# Patient Record
Sex: MALE | Race: WHITE | NOT HISPANIC OR LATINO
[De-identification: names, ages, dates, MRNs, and addresses within clinical notes are randomized per-mention and may not be internally consistent; named-entity substitution may affect disease eponyms.]

---

## 2021-02-22 PROBLEM — Z00.00 ENCOUNTER FOR PREVENTIVE HEALTH EXAMINATION: Status: ACTIVE | Noted: 2021-02-22

## 2021-04-13 ENCOUNTER — APPOINTMENT (OUTPATIENT)
Dept: NEUROLOGY | Facility: CLINIC | Age: 61
End: 2021-04-13
Payer: COMMERCIAL

## 2021-04-13 VITALS
SYSTOLIC BLOOD PRESSURE: 124 MMHG | WEIGHT: 249.7 LBS | HEIGHT: 68 IN | BODY MASS INDEX: 37.84 KG/M2 | DIASTOLIC BLOOD PRESSURE: 78 MMHG

## 2021-04-13 DIAGNOSIS — Z86.39 PERSONAL HISTORY OF OTHER ENDOCRINE, NUTRITIONAL AND METABOLIC DISEASE: ICD-10-CM

## 2021-04-13 DIAGNOSIS — Z82.3 FAMILY HISTORY OF STROKE: ICD-10-CM

## 2021-04-13 DIAGNOSIS — Z86.69 PERSONAL HISTORY OF OTHER DISEASES OF THE NERVOUS SYSTEM AND SENSE ORGANS: ICD-10-CM

## 2021-04-13 DIAGNOSIS — Z86.79 PERSONAL HISTORY OF OTHER DISEASES OF THE CIRCULATORY SYSTEM: ICD-10-CM

## 2021-04-13 DIAGNOSIS — Z82.49 FAMILY HISTORY OF ISCHEMIC HEART DISEASE AND OTHER DISEASES OF THE CIRCULATORY SYSTEM: ICD-10-CM

## 2021-04-13 DIAGNOSIS — G31.84 MILD COGNITIVE IMPAIRMENT, SO STATED: ICD-10-CM

## 2021-04-13 PROCEDURE — 99072 ADDL SUPL MATRL&STAF TM PHE: CPT

## 2021-04-13 PROCEDURE — 93040 RHYTHM ECG WITH REPORT: CPT

## 2021-04-13 PROCEDURE — 95819 EEG AWAKE AND ASLEEP: CPT

## 2021-04-13 PROCEDURE — 99204 OFFICE O/P NEW MOD 45 MIN: CPT

## 2021-04-13 RX ORDER — HYDROCHLOROTHIAZIDE 25 MG/1
25 TABLET ORAL
Refills: 0 | Status: ACTIVE | COMMUNITY

## 2021-04-13 RX ORDER — CARVEDILOL 6.25 MG/1
6.25 TABLET, FILM COATED ORAL
Refills: 0 | Status: COMPLETED | COMMUNITY

## 2021-04-13 RX ORDER — CARVEDILOL 6.25 MG/1
6.25 TABLET, FILM COATED ORAL
Refills: 0 | Status: ACTIVE | COMMUNITY

## 2021-04-13 RX ORDER — ROSUVASTATIN CALCIUM 5 MG/1
5 TABLET, FILM COATED ORAL
Refills: 0 | Status: ACTIVE | COMMUNITY

## 2021-04-13 NOTE — REVIEW OF SYSTEMS
[As Noted in HPI] : as noted in HPI [Loss Of Hearing] : hearing loss [Negative] : Heme/Lymph [Heartburn] : heartburn

## 2021-04-13 NOTE — ASSESSMENT
[FreeTextEntry1] : This is a 60-year-old man with mild cognitive impairment.\par He scored perfectly on Mini-Mental state examination in my office today.\par \par I would like to obtain an MRI of the brain to evaluate for structural pathology.\par I will obtain an EEG.\par I will obtain blood tests to look for reversible causes of memory loss.\par \par This may have been exacerbated due to the psychosocial stressors he is under.\par \par I will refer him for a neuropsychology evaluation.\par \par I will see him back in a few months.
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
## 2021-04-13 NOTE — PHYSICAL EXAM
[General Appearance - Alert] : alert [General Appearance - In No Acute Distress] : in no acute distress [Affect] : the affect was normal [Cranial Nerves Optic (II)] : visual acuity intact bilaterally,  visual fields full to confrontation, pupils equal round and reactive to light [Cranial Nerves Oculomotor (III)] : extraocular motion intact [Cranial Nerves Trigeminal (V)] : facial sensation intact symmetrically [Cranial Nerves Facial (VII)] : face symmetrical [Cranial Nerves Glossopharyngeal (IX)] : tongue and palate midline [Cranial Nerves Accessory (XI - Cranial And Spinal)] : head turning and shoulder shrug symmetric [Cranial Nerves Hypoglossal (XII)] : there was no tongue deviation with protrusion [Motor Tone] : muscle tone was normal in all four extremities [Motor Strength] : muscle strength was normal in all four extremities [Sensation Tactile Decrease] : light touch was intact [Sensation Pain / Temperature Decrease] : pain and temperature was intact [Sensation Vibration Decrease] : vibration was intact [Abnormal Walk] : normal gait [2+] : Ankle jerk right 2+ [Optic Disc Abnormality] : the optic disc were normal in size and color [Oriented To Time, Place, And Person] : oriented to person, place, and time [Memory Remote] : remote memory was not impaired [Total Score ___ / 30] : the patient achieved a score of [unfilled] /30 [Date / Time ___ / 5] : date / time [unfilled] / 5 [Place ___ / 5] : place [unfilled] / 5 [Registration ___ / 3] : registration [unfilled] / 3 [Serial Sevens ___/5] : serial sevens [unfilled] / 5 [Naming 2 Objects ___ / 2] : naming two objects [unfilled] / 2 [Repeating a Sentence ___ / 1] : repeating a sentence [unfilled] / 1 [Writing a Sentence ___ / 1] : write sentence [unfilled] / 1 [3-stage Verbal Command ___ / 3] : three-stage verbal command [unfilled] / 3 [Written Command ___ / 1] : written command [unfilled] / 1 [Copy a Design ___ / 1] : copy a design [unfilled] / 1 [Recall ___ / 3] : recall [unfilled] / 3 [Aphasia] : no dysphasia/aphasia [Romberg's Sign] : Romberg's sign was negtive [Coordination - Dysmetria Impaired Finger-to-Nose Bilateral] : not present [Plantar Reflex Right Only] : normal on the right [FreeTextEntry9] : (He had recent surgery on the left foot).

## 2021-04-15 ENCOUNTER — NON-APPOINTMENT (OUTPATIENT)
Age: 61
End: 2021-04-15

## 2021-04-19 ENCOUNTER — APPOINTMENT (OUTPATIENT)
Dept: MRI IMAGING | Facility: CLINIC | Age: 61
End: 2021-04-19
Payer: COMMERCIAL

## 2021-04-19 ENCOUNTER — OUTPATIENT (OUTPATIENT)
Dept: OUTPATIENT SERVICES | Facility: HOSPITAL | Age: 61
LOS: 1 days | End: 2021-04-19
Payer: COMMERCIAL

## 2021-04-19 DIAGNOSIS — G31.84 MILD COGNITIVE IMPAIRMENT OF UNCERTAIN OR UNKNOWN ETIOLOGY: ICD-10-CM

## 2021-04-19 DIAGNOSIS — Z87.438 PERSONAL HISTORY OF OTHER DISEASES OF MALE GENITAL ORGANS: Chronic | ICD-10-CM

## 2021-04-19 DIAGNOSIS — K46.9 UNSPECIFIED ABDOMINAL HERNIA WITHOUT OBSTRUCTION OR GANGRENE: Chronic | ICD-10-CM

## 2021-04-19 DIAGNOSIS — Z98.89 OTHER SPECIFIED POSTPROCEDURAL STATES: Chronic | ICD-10-CM

## 2021-04-19 PROCEDURE — 70551 MRI BRAIN STEM W/O DYE: CPT

## 2021-04-19 PROCEDURE — 70551 MRI BRAIN STEM W/O DYE: CPT | Mod: 26

## 2021-04-28 ENCOUNTER — LABORATORY RESULT (OUTPATIENT)
Age: 61
End: 2021-04-28

## 2021-04-29 ENCOUNTER — NON-APPOINTMENT (OUTPATIENT)
Age: 61
End: 2021-04-29

## 2021-04-29 LAB
FOLATE SERPL-MCNC: 10.5 NG/ML
T3RU NFR SERPL: 1 TBI
T4 SERPL-MCNC: 6 UG/DL
TSH SERPL-ACNC: 2.67 UIU/ML
VIT B12 SERPL-MCNC: 511 PG/ML

## 2021-05-02 LAB — METHYLMALONATE SERPL-SCNC: 200 NMOL/L

## 2021-07-22 ENCOUNTER — APPOINTMENT (OUTPATIENT)
Dept: NEUROLOGY | Facility: CLINIC | Age: 61
End: 2021-07-22

## 2021-10-12 ENCOUNTER — APPOINTMENT (OUTPATIENT)
Dept: ORTHOPEDIC SURGERY | Facility: CLINIC | Age: 61
End: 2021-10-12
Payer: COMMERCIAL

## 2021-10-12 VITALS
SYSTOLIC BLOOD PRESSURE: 127 MMHG | HEIGHT: 68 IN | WEIGHT: 257 LBS | HEART RATE: 88 BPM | DIASTOLIC BLOOD PRESSURE: 84 MMHG | BODY MASS INDEX: 38.95 KG/M2

## 2021-10-12 PROCEDURE — 99204 OFFICE O/P NEW MOD 45 MIN: CPT | Mod: 25

## 2021-10-12 PROCEDURE — 29130 APPL FINGER SPLINT STATIC: CPT | Mod: F2

## 2021-10-12 NOTE — PHYSICAL EXAM
[de-identified] : GENERAL: Awake, alert, cooperative, answers questions appropriately. No acute distress.  Ambulates independently with a normal gait.\par SKIN: Warm, dry, intact. Color and turgor normal. \par LUNGS: Demonstrates unlabored breathing on room air with no accessory muscle use.\par EXTREMITIES: Warm and well-perfused. \par NEUROLOGICAL: Grossly intact.\par \par FOCUSED UPPER EXTREMITY EXAM: \par bilateral UE:\par -skin intact without any erythema, ecchymosis, or joint swelling; normal finger cascade with no malrotation; mild swelling along the middle and ring fingers\par -no thenar atrophy; no intrinsics wasting; 5/5 strength thumb opposition; 5/5 strength intrinsics\par -mild tenderness to palpation A1 pulley of the affected finger(s) with palpable nodule, occasional triggering during digit AROM \par -no tenderness to palpation along first dorsal wrist compartment\par -no tenderness to palpation at the anatomic snuffbox\par -no tenderness to palpation at base of the thumb\par -no pain with passive thumb circumduction with negative grind test\par -no pain with resisted thumb extension with negative Finkelstein test\par -no tenderness to palpation in the SL interval\par -no tenderness at the fovea\par -able to make full fist, free AROM in all fingers except for the affected finger(s) with stiffness; no scissoring\par -full wrist ROM; full forearm supination/pronation; no ECU subluxation; DRUJ stable and nontender\par -negative carpal tunnel compression test, negative Phalen's, negative Tinel's at wrist\par -sensation intact in all digits and in the radial, ulnar, and median nerve distributions\par -Brisk capillary refill, fingers warm well perfused\par

## 2021-10-12 NOTE — PROCEDURE
[FreeTextEntry1] : Finger Splint application\par \par With patient's verbal consent, an oval-8 finger splint was fitted and applied to the right middle, left middle and ring fingers for the patient to keep PIP in extension at night.  He tolerated the procedure well and is NVI afterwards.  Splint instructions explained and he expressed understanding.

## 2021-10-12 NOTE — HISTORY OF PRESENT ILLNESS
[FreeTextEntry1] : 10/12/2021\par Mr. HAROON ST is a pleasant 60 year old male, RHD, part time  and animal shelter volunteer.\par He is hard of hearing but read lips.\par \par He presents to the office with his wife for evaluation of bilateral middle and ring finger triggering.\par He has had these symptoms for two years.\par He was treated by outside provider with cortisone injection on each hand with marginal help, but therapy seemed to help more.\par \par He denies prior injury.\par Currently his pain is constant, achy, sharp at time, without radiation.\par He denies paresthesia in the fingertips.\par He endorses night symptoms but splinting helps\par Symptoms improve with rest.\par Symptoms worsen with repetitive activity.\par  \par He is not diabetic.\par He denies history of thyroid disease.\par He denies current nicotine use.\par He denies recreational drug use.\par He does not take any narcotic pain medication.

## 2021-10-12 NOTE — DISCUSSION/SUMMARY
[FreeTextEntry1] : 60 year old male with bilateral middle and ring finger recurrent trigger fingers\par \par -We discussed in detail the clinical findings\par -We discussed the pathophysiology and natural history of patient's condition\par -nonsurgical and surgical management options were discussed in detail\par -Patient has tried a course of nonoperative management and continues to have symptoms affecting activities of daily living and quality of life, he would like to proceed to surgical management of his condition starting on the left side as this was the more symptomatic side\par -We discussed in detail the planned surgical procedure of left middle and ring finger trigger finger releases, including the risks, benefits, alternatives, and expected post-operative course\par -risks include but not limited to: anesthesia complication; bleeding; infection; damage to surrounding structures including nerve, blood vessel, muscle, tendon, ligament, bone, skin; persistent pain; stiffness; complex regional pain syndrome; wound healing complication; scarring; loss of function; residual or recurrent symptoms; need for additional surgery; blood clots; pulmonary embolism; heart attack; stroke; even death. \par -Patient would like to proceed with surgery, he will discuss with our  to select a surgical date\par -I provided him with finger extension splints for night time wear\par -I also provided him with home exercises and/or educational material when available and/or appropriate\par -I prescribed a short course of meloxicam daily for two weeks.  We discussed the GI and renal side effects of NSAIDs.\par -I will see him after surgery for follow up, sooner as needed \par -Patient and family had the opportunity to ask questions and they were in agreement with the plan.\par -Over 50% of the time spent with the patient was on counseling the patient on the above diagnosis, treatment plan and prognosis.

## 2021-11-05 ENCOUNTER — OUTPATIENT (OUTPATIENT)
Dept: OUTPATIENT SERVICES | Facility: HOSPITAL | Age: 61
LOS: 1 days | End: 2021-11-05
Payer: COMMERCIAL

## 2021-11-05 DIAGNOSIS — K46.9 UNSPECIFIED ABDOMINAL HERNIA WITHOUT OBSTRUCTION OR GANGRENE: Chronic | ICD-10-CM

## 2021-11-05 DIAGNOSIS — Z01.818 ENCOUNTER FOR OTHER PREPROCEDURAL EXAMINATION: ICD-10-CM

## 2021-11-05 DIAGNOSIS — Z98.89 OTHER SPECIFIED POSTPROCEDURAL STATES: Chronic | ICD-10-CM

## 2021-11-05 DIAGNOSIS — Z87.438 PERSONAL HISTORY OF OTHER DISEASES OF MALE GENITAL ORGANS: Chronic | ICD-10-CM

## 2021-11-05 LAB
ANION GAP SERPL CALC-SCNC: 12 MMOL/L — SIGNIFICANT CHANGE UP (ref 5–17)
BASOPHILS # BLD AUTO: 0.07 K/UL — SIGNIFICANT CHANGE UP (ref 0–0.2)
BASOPHILS NFR BLD AUTO: 0.8 % — SIGNIFICANT CHANGE UP (ref 0–2)
BUN SERPL-MCNC: 19.7 MG/DL — SIGNIFICANT CHANGE UP (ref 8–20)
CALCIUM SERPL-MCNC: 10.2 MG/DL — SIGNIFICANT CHANGE UP (ref 8.6–10.2)
CHLORIDE SERPL-SCNC: 102 MMOL/L — SIGNIFICANT CHANGE UP (ref 98–107)
CO2 SERPL-SCNC: 28 MMOL/L — SIGNIFICANT CHANGE UP (ref 22–29)
CREAT SERPL-MCNC: 0.93 MG/DL — SIGNIFICANT CHANGE UP (ref 0.5–1.3)
EOSINOPHIL # BLD AUTO: 0.26 K/UL — SIGNIFICANT CHANGE UP (ref 0–0.5)
EOSINOPHIL NFR BLD AUTO: 3.1 % — SIGNIFICANT CHANGE UP (ref 0–6)
GLUCOSE SERPL-MCNC: 106 MG/DL — HIGH (ref 70–99)
HCT VFR BLD CALC: 44.7 % — SIGNIFICANT CHANGE UP (ref 39–50)
HGB BLD-MCNC: 14.6 G/DL — SIGNIFICANT CHANGE UP (ref 13–17)
IMM GRANULOCYTES NFR BLD AUTO: 0.4 % — SIGNIFICANT CHANGE UP (ref 0–1.5)
LYMPHOCYTES # BLD AUTO: 1.4 K/UL — SIGNIFICANT CHANGE UP (ref 1–3.3)
LYMPHOCYTES # BLD AUTO: 16.8 % — SIGNIFICANT CHANGE UP (ref 13–44)
MCHC RBC-ENTMCNC: 28.3 PG — SIGNIFICANT CHANGE UP (ref 27–34)
MCHC RBC-ENTMCNC: 32.7 GM/DL — SIGNIFICANT CHANGE UP (ref 32–36)
MCV RBC AUTO: 86.6 FL — SIGNIFICANT CHANGE UP (ref 80–100)
MONOCYTES # BLD AUTO: 0.85 K/UL — SIGNIFICANT CHANGE UP (ref 0–0.9)
MONOCYTES NFR BLD AUTO: 10.2 % — SIGNIFICANT CHANGE UP (ref 2–14)
NEUTROPHILS # BLD AUTO: 5.73 K/UL — SIGNIFICANT CHANGE UP (ref 1.8–7.4)
NEUTROPHILS NFR BLD AUTO: 68.7 % — SIGNIFICANT CHANGE UP (ref 43–77)
PLATELET # BLD AUTO: 290 K/UL — SIGNIFICANT CHANGE UP (ref 150–400)
POTASSIUM SERPL-MCNC: 3.7 MMOL/L — SIGNIFICANT CHANGE UP (ref 3.5–5.3)
POTASSIUM SERPL-SCNC: 3.7 MMOL/L — SIGNIFICANT CHANGE UP (ref 3.5–5.3)
RBC # BLD: 5.16 M/UL — SIGNIFICANT CHANGE UP (ref 4.2–5.8)
RBC # FLD: 13.6 % — SIGNIFICANT CHANGE UP (ref 10.3–14.5)
SODIUM SERPL-SCNC: 142 MMOL/L — SIGNIFICANT CHANGE UP (ref 135–145)
WBC # BLD: 8.34 K/UL — SIGNIFICANT CHANGE UP (ref 3.8–10.5)
WBC # FLD AUTO: 8.34 K/UL — SIGNIFICANT CHANGE UP (ref 3.8–10.5)

## 2021-11-05 PROCEDURE — 85025 COMPLETE CBC W/AUTO DIFF WBC: CPT

## 2021-11-05 PROCEDURE — G0463: CPT

## 2021-11-05 PROCEDURE — 36415 COLL VENOUS BLD VENIPUNCTURE: CPT

## 2021-11-05 PROCEDURE — 93010 ELECTROCARDIOGRAM REPORT: CPT

## 2021-11-05 PROCEDURE — 80048 BASIC METABOLIC PNL TOTAL CA: CPT

## 2021-11-05 PROCEDURE — 93005 ELECTROCARDIOGRAM TRACING: CPT

## 2021-11-10 DIAGNOSIS — Z01.818 ENCOUNTER FOR OTHER PREPROCEDURAL EXAMINATION: ICD-10-CM

## 2021-11-19 ENCOUNTER — APPOINTMENT (OUTPATIENT)
Dept: DISASTER EMERGENCY | Facility: CLINIC | Age: 61
End: 2021-11-19

## 2021-11-20 LAB — SARS-COV-2 N GENE NPH QL NAA+PROBE: NOT DETECTED

## 2021-11-22 ENCOUNTER — APPOINTMENT (OUTPATIENT)
Dept: ORTHOPEDIC SURGERY | Facility: AMBULATORY SURGERY CENTER | Age: 61
End: 2021-11-22
Payer: COMMERCIAL

## 2021-11-22 PROCEDURE — 26055 INCISE FINGER TENDON SHEATH: CPT | Mod: F3

## 2021-12-07 ENCOUNTER — APPOINTMENT (OUTPATIENT)
Dept: ORTHOPEDIC SURGERY | Facility: CLINIC | Age: 61
End: 2021-12-07
Payer: COMMERCIAL

## 2021-12-07 DIAGNOSIS — M65.331 TRIGGER FINGER, RIGHT MIDDLE FINGER: ICD-10-CM

## 2021-12-07 DIAGNOSIS — M65.341 TRIGGER FINGER, RIGHT RING FINGER: ICD-10-CM

## 2021-12-07 DIAGNOSIS — M65.342 TRIGGER FINGER, LEFT RING FINGER: ICD-10-CM

## 2021-12-07 DIAGNOSIS — M65.332 TRIGGER FINGER, LEFT MIDDLE FINGER: ICD-10-CM

## 2021-12-07 PROCEDURE — 99024 POSTOP FOLLOW-UP VISIT: CPT

## 2021-12-07 PROCEDURE — 29130 APPL FINGER SPLINT STATIC: CPT | Mod: F8,79

## 2022-08-11 ENCOUNTER — APPOINTMENT (OUTPATIENT)
Dept: PHYSICAL MEDICINE AND REHAB | Facility: CLINIC | Age: 62
End: 2022-08-11

## 2022-08-11 ENCOUNTER — NON-APPOINTMENT (OUTPATIENT)
Age: 62
End: 2022-08-11

## 2022-08-11 VITALS
WEIGHT: 257 LBS | BODY MASS INDEX: 38.95 KG/M2 | HEART RATE: 82 BPM | TEMPERATURE: 98 F | HEIGHT: 68 IN | OXYGEN SATURATION: 96 % | SYSTOLIC BLOOD PRESSURE: 131 MMHG | DIASTOLIC BLOOD PRESSURE: 94 MMHG

## 2022-08-11 PROCEDURE — 99204 OFFICE O/P NEW MOD 45 MIN: CPT

## 2022-08-19 NOTE — REASON FOR VISIT
[Initial Consultation] : an initial consultation [Head Injury Evaluation] : a head injury evaluation [Significant Other] : significant other

## 2022-08-19 NOTE — ASSESSMENT
[FreeTextEntry1] : Pt. and his wife counselled at length on symptoms and pathology of concussion, recovery course and expected prognosis. Patient symptoms have been improving and reassured that he she will make a full recovery. Counseled on continuing to gradually increase his her activities allowing symptoms to be a guide, allowing himself extra time and rest breaks during a task and limiting stimulation. \par \par \par Referral to PT for balance and vestibular therapy.  Rx provided-- scanned to chart\par \par Zofran PRN ordered for Nausea\par \par Not cleared to return to work at this point\par \par f/u in 3 weeks\par \par All questions answered.\par \par

## 2022-08-19 NOTE — PHYSICAL EXAM
[Person] : Oriented to self [Place] : Oriented to place [Time] : Oriented to time [Short Term Intact] : Short term memory intact [Remote Intact] : Remote memory intact [Span Intact] : Attention is normal [Concentration Intact] : Concentration is normal [Comprehension] : Comprehension is intact [Symptoms with Head Turns on Fixed Point] : Symptoms with head turns on a fixed point [Nystagmus] : Nystagmus present [Vestibular Ocular Reflex Normal] : Vestibular ocular reflex normal [Full Cervical ROM] : Full cervical ROM is present [Normal] : There is no dysmetria. [Saccades] : No saccades [Cervical Tenderness] : No cervical tenderness present [de-identified] : Mild dysfluency at baseline due to hearing loss [de-identified] : + dicks Hallpike maneuver bilaterally + Nystagmus [de-identified] : + [de-identified] : Mild balance impairment
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
## 2022-08-23 RX ORDER — NIFEDIPINE 90 MG/1
90 TABLET, EXTENDED RELEASE ORAL
Refills: 0 | Status: DISCONTINUED | COMMUNITY
End: 2022-08-23

## 2022-09-01 ENCOUNTER — APPOINTMENT (OUTPATIENT)
Dept: PHYSICAL MEDICINE AND REHAB | Facility: CLINIC | Age: 62
End: 2022-09-01

## 2022-09-01 VITALS
HEART RATE: 83 BPM | TEMPERATURE: 97.6 F | DIASTOLIC BLOOD PRESSURE: 100 MMHG | OXYGEN SATURATION: 95 % | BODY MASS INDEX: 38.95 KG/M2 | SYSTOLIC BLOOD PRESSURE: 144 MMHG | HEIGHT: 68 IN | WEIGHT: 257 LBS

## 2022-09-01 VITALS — SYSTOLIC BLOOD PRESSURE: 145 MMHG | DIASTOLIC BLOOD PRESSURE: 97 MMHG

## 2022-09-01 PROCEDURE — 99214 OFFICE O/P EST MOD 30 MIN: CPT

## 2022-09-01 NOTE — REASON FOR VISIT
[Follow-up Evaluation] : a follow-up evaluation [Head Injury Evaluation] : a head injury evaluation [Dizziness] : dizziness [Significant Other] : significant other

## 2022-09-09 NOTE — PHYSICAL EXAM
[Place] : Oriented to place [Time] : Oriented to time [Short Term Intact] : Short term memory intact [Span Intact] : Attention is normal [Symptoms with Head Turns on Fixed Point] : Symptoms with head turns on a fixed point [Saccades] : No saccades [Nystagmus] : Nystagmus present [Vestibular Ocular Reflex Normal] : Vestibular ocular reflex normal [Full Cervical ROM] : Full cervical ROM is present [Cervical Tenderness] : No cervical tenderness present [Normal] : Patient has a normal gait. [de-identified] : Dizziness with vertical head motion when eyes fixed on a point.    + Dicks Hallpike maneuver bilat.

## 2022-09-09 NOTE — ASSESSMENT
[FreeTextEntry1] : Continue PT--- Pt. responding well to vestibular therapy.  \par \par Pt provided reassurance he will recover from his concussion based on his progress thus far.   Imaging reviewed with patient based on Neuroimaging reports.   [facility] sent a CD of imaging but seems to be a blank disk and I could not view images.   Patient and his wife requesting me to review his neuroimaging for their reassurance.  My office contacted the hospital to notify the issue with CD sent and they are sending another CD with correct imaging.   Will review once received.\par \par Pt. provided reassurance that based on his clinical exam, I have no suspicion for any intracranial pathology from his injury.  He denies headache, nausea, vomiting, worsening vision or lethargy that would be concerning for ICP elevation.  \par \par All questions answered.\par \par \par
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
## 2022-10-20 ENCOUNTER — APPOINTMENT (OUTPATIENT)
Dept: PHYSICAL MEDICINE AND REHAB | Facility: CLINIC | Age: 62
End: 2022-10-20

## 2022-10-20 VITALS
TEMPERATURE: 98.5 F | SYSTOLIC BLOOD PRESSURE: 141 MMHG | HEIGHT: 68 IN | WEIGHT: 266 LBS | DIASTOLIC BLOOD PRESSURE: 95 MMHG | BODY MASS INDEX: 40.32 KG/M2 | HEART RATE: 77 BPM | OXYGEN SATURATION: 96 %

## 2022-10-20 DIAGNOSIS — L56.8 OTHER SPECIFIED ACUTE SKIN CHANGES DUE TO ULTRAVIOLET RADIATION: ICD-10-CM

## 2022-10-20 DIAGNOSIS — H83.2X9 LABYRINTHINE DYSFUNCTION, UNSPECIFIED EAR: ICD-10-CM

## 2022-10-20 PROCEDURE — 99213 OFFICE O/P EST LOW 20 MIN: CPT

## 2022-10-23 NOTE — HISTORY OF PRESENT ILLNESS
[Fall Related] : Fall related [Head CT Normal] : head CT was normal [2] : Light Sensitivity: 2 - A lot [0] : Confused: 0 - None [1] : Difficulty concentrating/rememberin - A little [Amnesia - Retrograde] : had amnesia - retrograde [Amnesia - Anterograde] : had amnesia - anterograde [FreeTextEntry1] : 08/07/2022 [FreeTextEntry2] : Was at hotel indoor pool -- got out of pool -- walking to table where possessions were and fell\par fell backward and hit head. Had AMS initially for few seconds.  [FreeTextEntry4] : l [de-identified] : last seen 9/1/2022.  Attending PT for vestibular therapy--  last Note reviewed 10/14--Treated with Epley for right nystagmus-- slow upbeat torsional nystagmus.  improvement in vertigo from last session.  Improving in balance overall on uneven surfaces.  \par Patient states his vertigo is improved--almost resolved. Balance improved significantly.  Notes some visual symptoms-- Light sensitivity, states sometimes when turns to left or looks up, feels that his visual picture is delayed for him to process-- usually gets dizziness at the same time but not vertigo.  \par Gets an electrical shooting sensation in the back of his head Occasionally --not everyday.  No specific trigger.  last few seconds,  will get dizziness for a few minutes --but not debilitating.  \par \par Dizziness for 30 sec. when first gets up in the am, then resolves\par \par  [de-identified] : 76 [de-identified] : 7

## 2022-10-23 NOTE — PHYSICAL EXAM
[Nystagmus] : Nystagmus present [Full Cervical ROM] : Full cervical ROM is present [Normal] : There is no dysmetria. [Saccades] : No saccades [Symptoms with Head Turns on Fixed Point] : No symptoms present with head turns on a fixed point [Vestibular Ocular Reflex Normal] : Vestibular ocular reflex abnormal [Cervical Tenderness] : No cervical tenderness present [Single Stance] : Single stance is abnormal [Tandem Stance] : Tandem stance is abnormal Declines [de-identified] : Leah Sexton [de-identified] : Nystagmus when looking to left

## 2022-10-23 NOTE — PLAN
[FreeTextEntry1] : Referral to NeuroOptometry--Dr. Tapia-- for VOR dysfunction and visual processing impairment\par \par Cont. PT-- new rx provided\par \par f/u 2-3 months\par All questions answered.\par

## 2022-11-14 ENCOUNTER — APPOINTMENT (OUTPATIENT)
Dept: NEUROLOGY | Facility: CLINIC | Age: 62
End: 2022-11-14

## 2022-11-14 VITALS
SYSTOLIC BLOOD PRESSURE: 140 MMHG | DIASTOLIC BLOOD PRESSURE: 90 MMHG | HEIGHT: 68 IN | WEIGHT: 265 LBS | BODY MASS INDEX: 40.16 KG/M2

## 2022-11-14 DIAGNOSIS — R26.89 OTHER ABNORMALITIES OF GAIT AND MOBILITY: ICD-10-CM

## 2022-11-14 DIAGNOSIS — S06.0X1A CONCUSSION WITH LOSS OF CONSCIOUSNESS OF 30 MINUTES OR LESS, INITIAL ENCOUNTER: ICD-10-CM

## 2022-11-14 PROCEDURE — 99214 OFFICE O/P EST MOD 30 MIN: CPT

## 2022-11-14 RX ORDER — VALSARTAN 80 MG/1
80 TABLET, COATED ORAL
Qty: 180 | Refills: 0 | Status: ACTIVE | COMMUNITY
Start: 2022-06-23

## 2022-11-14 RX ORDER — MULTIVITAMIN
TABLET ORAL
Refills: 0 | Status: COMPLETED | COMMUNITY
End: 2022-11-14

## 2022-11-14 RX ORDER — MELOXICAM 15 MG/1
15 TABLET ORAL DAILY
Qty: 14 | Refills: 0 | Status: COMPLETED | COMMUNITY
Start: 2021-10-12 | End: 2022-11-14

## 2022-11-14 RX ORDER — CANDESARTAN CILEXETIL 32 MG/1
32 TABLET ORAL
Refills: 0 | Status: COMPLETED | COMMUNITY
End: 2022-11-14

## 2022-11-14 RX ORDER — ONDANSETRON 4 MG/1
4 TABLET, ORALLY DISINTEGRATING ORAL
Qty: 16 | Refills: 2 | Status: COMPLETED | COMMUNITY
Start: 2022-08-11 | End: 2022-11-14

## 2022-11-14 NOTE — DATA REVIEWED
[de-identified] : Brain MRI was performed on 4/19/2021.\par The study was unremarkable. [de-identified] : EEG was normal. [de-identified] : B12 level was 511.\par Thyroid function was normal.\par \par CT scan of the head was performed on 8/7/2022.\par The study was unremarkable.

## 2022-11-14 NOTE — PHYSICAL EXAM
[General Appearance - Alert] : alert [General Appearance - In No Acute Distress] : in no acute distress [Oriented To Time, Place, And Person] : oriented to person, place, and time [Affect] : the affect was normal [Memory Remote] : remote memory was not impaired [Cranial Nerves Optic (II)] : visual acuity intact bilaterally,  visual fields full to confrontation, pupils equal round and reactive to light [Cranial Nerves Oculomotor (III)] : extraocular motion intact [Cranial Nerves Trigeminal (V)] : facial sensation intact symmetrically [Cranial Nerves Facial (VII)] : face symmetrical [Cranial Nerves Glossopharyngeal (IX)] : tongue and palate midline [Cranial Nerves Accessory (XI - Cranial And Spinal)] : head turning and shoulder shrug symmetric [Cranial Nerves Hypoglossal (XII)] : there was no tongue deviation with protrusion [Motor Tone] : muscle tone was normal in all four extremities [Motor Strength] : muscle strength was normal in all four extremities [Sensation Tactile Decrease] : light touch was intact [Sensation Pain / Temperature Decrease] : pain and temperature was intact [Sensation Vibration Decrease] : vibration was intact [Abnormal Walk] : normal gait [2+] : Patella left 2+ [Optic Disc Abnormality] : the optic disc were normal in size and color [Total Score ___ / 30] : the patient achieved a score of [unfilled] /30 [Date / Time ___ / 5] : date / time [unfilled] / 5 [Place ___ / 5] : place [unfilled] / 5 [Registration ___ / 3] : registration [unfilled] / 3 [Serial Sevens ___/5] : serial sevens [unfilled] / 5 [Naming 2 Objects ___ / 2] : naming two objects [unfilled] / 2 [Repeating a Sentence ___ / 1] : repeating a sentence [unfilled] / 1 [Writing a Sentence ___ / 1] : write sentence [unfilled] / 1 [3-stage Verbal Command ___ / 3] : three-stage verbal command [unfilled] / 3 [Written Command ___ / 1] : written command [unfilled] / 1 [Copy a Design ___ / 1] : copy a design [unfilled] / 1 [Recall ___ / 3] : recall [unfilled] / 3 [Dysarthria] : no dysarthria [Aphasia] : no dysphasia/aphasia [Romberg's Sign] : Romberg's sign was negtive [Coordination - Dysmetria Impaired Finger-to-Nose Bilateral] : not present [Plantar Reflex Right Only] : normal on the right [Plantar Reflex Left Only] : normal on the left
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
## 2022-11-14 NOTE — CONSULT LETTER
[Dear  ___] : Dear  [unfilled], [Courtesy Letter:] : I had the pleasure of seeing your patient, [unfilled], in my office today. [Please see my note below.] : Please see my note below. [Consult Closing:] : Thank you very much for allowing me to participate in the care of this patient.  If you have any questions, please do not hesitate to contact me. [Sincerely,] : Sincerely, [FreeTextEntry3] : Massimo Pillai MD.
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
## 2022-12-22 ENCOUNTER — APPOINTMENT (OUTPATIENT)
Dept: PHYSICAL MEDICINE AND REHAB | Facility: CLINIC | Age: 62
End: 2022-12-22

## 2022-12-22 VITALS
SYSTOLIC BLOOD PRESSURE: 146 MMHG | DIASTOLIC BLOOD PRESSURE: 91 MMHG | HEIGHT: 68 IN | OXYGEN SATURATION: 95 % | HEART RATE: 92 BPM | BODY MASS INDEX: 40.77 KG/M2 | TEMPERATURE: 97.8 F | WEIGHT: 269 LBS

## 2022-12-22 DIAGNOSIS — H53.9 UNSPECIFIED VISUAL DISTURBANCE: ICD-10-CM

## 2022-12-22 DIAGNOSIS — R42 DIZZINESS AND GIDDINESS: ICD-10-CM

## 2022-12-22 DIAGNOSIS — S06.0X9A CONCUSSION WITH LOSS OF CONSCIOUSNESS OF UNSPECIFIED DURATION, INITIAL ENCOUNTER: ICD-10-CM

## 2022-12-22 DIAGNOSIS — R43.9 UNSPECIFIED DISTURBANCES OF SMELL AND TASTE: ICD-10-CM

## 2022-12-22 PROCEDURE — 99213 OFFICE O/P EST LOW 20 MIN: CPT

## 2022-12-23 PROBLEM — R43.9 DYSOSMIA: Status: ACTIVE | Noted: 2022-12-23

## 2022-12-23 NOTE — HISTORY OF PRESENT ILLNESS
[Fall Related] : Fall related English [Amnesia - Retrograde] : had amnesia - retrograde [Amnesia - Anterograde] : had amnesia - anterograde [2] : Dizziness or Lightheaded: 2 - A lot [1] : Difficulty concentrating/rememberin - A little [FreeTextEntry1] : 08/07/2022 [FreeTextEntry2] : Fall related. Was at hotel indoor pool -- got out of pool -- walking to table where possessions were and fell\par fell backward and hit head. Had AMS initially for few seconds.  [de-identified] : --completed Vestibular therapy 11/1- Met goals--Vertigo resolved.--No more spinning. \par --   reports dizziness when turning to left-- lasts about 4-8 secs.   Looking up or looking down triggers intermittent dizziness after 10-15 Sec.  --states has to modifiy his work due to this\par No issues watching screens or driving or any other activities, walking or negotiating steps. \par Denies neck pain, no vision changes\par Visual sx of shifting picture has resolved\par \par --Was seen by Neuro-optometry, Dr. Magdaleno 10/26/22--  Was recommended Binasal occlusion on glasses and [place] XL Fitover glasses when outside-- Pt. states he no longer needs these\par --Also Dx with posterior subcapsular cataract right eye-- No treatment --f/u in 6 mo.\par --Myopia-- managed with glasses\par Exophoria-- Advised breaks with near work\par \par Overall pt. states he has significantly improved\par \par Working part-time \par Dysanomia--Peppermint, Food is tasting appropriately [0] : Nervous or Anxious: 0 - None [de-identified] : 3

## 2022-12-23 NOTE — PHYSICAL EXAM
[Full Cervical ROM] : Full cervical ROM is present [Saccades] : No saccades [Symptoms with Head Turns on Fixed Point] : No symptoms present with head turns on a fixed point [Nystagmus] : No nystagmus [Vestibular Ocular Reflex Normal] : Vestibular ocular reflex abnormal [Cervical Tenderness] : No cervical tenderness present [Normal] : Patient has a normal gait.

## 2022-12-23 NOTE — ASSESSMENT
[FreeTextEntry1] : Vestibular symptoms resolved-- completed Vestibular therapy.  \par \par Current intermittent dizziness not due to vestibular cause-- Rec. ENT evaluation\par \par Dysosmia-- May take 6 months to 1 year to self resolve.  \par \par f/u in 3 months\par \par All questions answered.\par

## 2023-03-21 ENCOUNTER — APPOINTMENT (OUTPATIENT)
Dept: PHYSICAL MEDICINE AND REHAB | Facility: CLINIC | Age: 63
End: 2023-03-21

## 2023-05-23 ENCOUNTER — APPOINTMENT (OUTPATIENT)
Dept: NEUROLOGY | Facility: CLINIC | Age: 63
End: 2023-05-23

## 2024-08-20 ENCOUNTER — APPOINTMENT (OUTPATIENT)
Dept: OTOLARYNGOLOGY | Facility: CLINIC | Age: 64
End: 2024-08-20
Payer: COMMERCIAL

## 2024-08-20 ENCOUNTER — TRANSCRIPTION ENCOUNTER (OUTPATIENT)
Age: 64
End: 2024-08-20

## 2024-08-20 VITALS — HEIGHT: 68 IN | BODY MASS INDEX: 39.45 KG/M2 | WEIGHT: 260.31 LBS

## 2024-08-20 DIAGNOSIS — S06.0XAA CONCUSSION WITH LOSS OF CONSCIOUSNESS STATUS UNKNOWN, INITIAL ENCOUNTER: ICD-10-CM

## 2024-08-20 DIAGNOSIS — R42 DIZZINESS AND GIDDINESS: ICD-10-CM

## 2024-08-20 DIAGNOSIS — H81.90 UNSPECIFIED DISORDER OF VESTIBULAR FUNCTION, UNSPECIFIED EAR: ICD-10-CM

## 2024-08-20 DIAGNOSIS — R26.89 OTHER ABNORMALITIES OF GAIT AND MOBILITY: ICD-10-CM

## 2024-08-20 DIAGNOSIS — H90.3 SENSORINEURAL HEARING LOSS, BILATERAL: ICD-10-CM

## 2024-08-20 DIAGNOSIS — H81.12 BENIGN PAROXYSMAL VERTIGO, LEFT EAR: ICD-10-CM

## 2024-08-20 PROCEDURE — 92504 EAR MICROSCOPY EXAMINATION: CPT

## 2024-08-20 PROCEDURE — 92567 TYMPANOMETRY: CPT

## 2024-08-20 PROCEDURE — 99204 OFFICE O/P NEW MOD 45 MIN: CPT

## 2024-08-20 PROCEDURE — 92557 COMPREHENSIVE HEARING TEST: CPT

## 2024-08-20 RX ORDER — FAMOTIDINE 10 MG/1
TABLET, FILM COATED ORAL
Refills: 0 | Status: ACTIVE | COMMUNITY

## 2024-08-20 RX ORDER — CHOLECALCIFEROL (VITAMIN D3) 25 MCG
TABLET ORAL
Refills: 0 | Status: ACTIVE | COMMUNITY

## 2024-08-20 NOTE — HISTORY OF PRESENT ILLNESS
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

## 2024-08-20 NOTE — DATA REVIEWED
[de-identified] : I have independently reviewed the patient's audiogram from today and my findings include emma severe-profound SNHL An audiogram was ordered and performed including tympanometry, pure tones and speech, for patient's complaint of vertigo

## 2024-08-20 NOTE — PROCEDURE
[Vertigo] : Vertigo [Same] : same as the Pre Op Dx. [] : Binocular Microscopy [FreeTextEntry1] : hearing loss, vertigo [FreeTextEntry4] : none [FreeTextEntry6] : Operative microscope was used to examine the ear canal, ear drum and visible middle ear landmarks. Adequate exam would not have been possible without the use of a microscope. Findings are described.

## 2024-08-20 NOTE — PHYSICAL EXAM
[Binocular Microscopic Exam] : Binocular microscopic exam was performed [Hearing Mason Test (Tuning Fork On Forehead)] : no lateralization of tone [Hearing Loss Right Only] : normal [Hearing Loss Left Only] : normal [Rinne Test Air Conduction Persists > Bone Conduction Right] : bone conduction greater than air conduction on the right [Rinne Test Air Conduction Persists > Bone Conduction Left] : bone conduction greater than air conduction on the left [Nystagmus] : ~T ~M nystagmus was seen [Fukuda Step Test] : Fukuda Step Test was Negative [Romberg's Sign] : Romberg's sign was absent [Fistula Sign] : Fistula Sign: Negative [Past-Pointing] : Past-Pointing: Negative [Jason-Milagroke] : [place]-Hallpike: Positive [FreeTextEntry1] : +DH to L [Normal] : no rashes

## 2024-11-19 ENCOUNTER — APPOINTMENT (OUTPATIENT)
Dept: OTOLARYNGOLOGY | Facility: CLINIC | Age: 64
End: 2024-11-19